# Patient Record
(demographics unavailable — no encounter records)

---

## 2025-05-14 NOTE — HISTORY OF PRESENT ILLNESS
[FreeTextEntry1] : moles [de-identified] : 66 year old female patient presents for skin check. Previous patient of Dr. Raymundo Concerns today: spots on back  has rosacea, uses eminence products Sunscreen use: on face every day  Hx of blistering sun burns: no Tanning Bed Use: no Personal history of skin cancer: none Family history of skin cancer: father, brothers

## 2025-05-14 NOTE — ASSESSMENT
[FreeTextEntry1] : #Multiple benign nevi - chronic, stable - I discussed the chronic nature and course of the condition - Photoprotection discussed, recommend daily broad-spectrum sunscreen, SPF 30 or greater, UPF hat, clothing. - Pt educated on ABCDE of melanoma - Recommend self-skin exam and annual skin exam by MD - Pt instructed to return for new or changing lesions especially if any moles start to change, itch, or bleed   #Seborrheic keratosis #Cherry angioma - chronic, stable -I discussed the chronic nature and course of the condition -counseled on benign nature -no tx needed unless symptomatic discussed option of cryo for cosmetic removal of SKs - pt will rtc if desired, reviewed risks and out of pocket cost  RTC 1 year for TBSE, sooner PRN

## 2025-05-14 NOTE — PHYSICAL EXAM
[Alert] : alert [Oriented x 3] : ~L oriented x 3 [FreeTextEntry3] : PE:   General: well-appearing, alert, in no acute distress Full body skin exam performed examining scalp, head, face, ears, eyes, mouth, neck, chest, back, abdomen, axilla, b/l arms, b/l forearms, b/l hands, b/l fingernails, b/l thighs, b/l legs, b/l feet, b/l toenails, groin, buttocks Pertinent findings include: -fingernails and toenails covered with opaque nail polish, not examined -scattered light brown to dark brown colored <6mm papules and macules on the trunk and extremities -brown stuck on papules, plaques on the trunk and extremities -red symmetric papules on the trunk and extremities